# Patient Record
Sex: MALE | Race: WHITE | NOT HISPANIC OR LATINO | Employment: FULL TIME | URBAN - METROPOLITAN AREA
[De-identification: names, ages, dates, MRNs, and addresses within clinical notes are randomized per-mention and may not be internally consistent; named-entity substitution may affect disease eponyms.]

---

## 2018-02-17 ENCOUNTER — TELEMEDICINE2 (OUTPATIENT)
Dept: URGENT CARE | Facility: CLINIC | Age: 33
End: 2018-02-17
Payer: COMMERCIAL

## 2018-02-17 DIAGNOSIS — J06.9 UPPER RESPIRATORY TRACT INFECTION, UNSPECIFIED TYPE: ICD-10-CM

## 2018-02-17 PROCEDURE — 99202 OFFICE O/P NEW SF 15 MIN: CPT | Performed by: PHYSICIAN ASSISTANT

## 2018-02-17 RX ORDER — DEXLANSOPRAZOLE 60 MG/1
CAPSULE, DELAYED RELEASE ORAL
COMMUNITY

## 2018-02-17 RX ORDER — DEXTROMETHORPHAN HYDROBROMIDE AND PROMETHAZINE HYDROCHLORIDE 15; 6.25 MG/5ML; MG/5ML
SYRUP ORAL
Qty: 180 ML | Refills: 0 | Status: SHIPPED | OUTPATIENT
Start: 2018-02-17

## 2018-02-17 ASSESSMENT — ENCOUNTER SYMPTOMS: COUGH: 1

## 2018-02-17 NOTE — PROGRESS NOTES
Subjective:      Cyrus Burns is a 32 y.o. male who presents with Cough (productive cough x 4 days, chest congestion, fever, some nasal congestion. pt states he has chills and body aches, pt has flu shot )            Subjective: Patient is a 32-year-old male who is having a cough for 4 days. This is a virtual visit. Patient reports that he's had cough, postnasal drip, sore throat that subsided but now the cough is what bothers him. It is sometimes dry and sometimes productive. Denies fever, chills, shortness of breath, chest pain or chest tightness. Lying down makes it worse, sitting up makes it better    P      Cough         Review of Systems   Respiratory: Positive for cough.           Objective:     There were no vitals taken for this visit.     Physical Exam       hysical exam:  Visual inspection, patient appears well nourished and well hydrated, nontoxic. He is breathing comfortably and is noncyanotic. He has not coughed during interview  No other physical exam could be done because this is virtual visit     Assessment/Plan:     1. Upper respiratory tract infection, unspecified type  Discussed likely viral etiology. We'll send Promethazine DM to the pharmacy. Again I discussed this is a virtual visit and in my professional opinion the best way to be treated is to go into a clinic so that vitals can be done and a proper lung examination can also be done. Discussed I would send cough medicine to the pharmacy but if his symptoms persist or worsen he needs to take himself to a clinic in [place] where he is living and be seen.